# Patient Record
Sex: MALE | Race: WHITE
[De-identification: names, ages, dates, MRNs, and addresses within clinical notes are randomized per-mention and may not be internally consistent; named-entity substitution may affect disease eponyms.]

---
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
## 2023-05-30 ENCOUNTER — HOSPITAL ENCOUNTER (EMERGENCY)
Dept: HOSPITAL 4 - SED | Age: 23
Discharge: HOME | End: 2023-05-30
Payer: COMMERCIAL

## 2023-05-30 VITALS — SYSTOLIC BLOOD PRESSURE: 128 MMHG

## 2023-05-30 DIAGNOSIS — F41.9: Primary | ICD-10-CM

## 2023-05-30 DIAGNOSIS — G47.00: ICD-10-CM

## 2023-05-30 DIAGNOSIS — Z79.899: ICD-10-CM
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
## 2023-05-30 NOTE — NUR
Patient given written and verbal discharge instructions and verbalizes 
understanding.  ER MD discussed with patient the results and treatment 
provided. Patient in stable condition. ID arm band removed. 

Rx of Ativan, Melatonin, Zaleplon  given. Patient educated on pain management 
and to follow up with PMD. Pain Scale 0/10 .

Opportunity for questions provided and answered. Medication side effect fact 
sheet provided.

## 2023-06-01 ENCOUNTER — HOSPITAL ENCOUNTER (EMERGENCY)
Dept: HOSPITAL 4 - SED | Age: 23
Discharge: HOME | End: 2023-06-01
Payer: MEDICAID

## 2023-06-01 VITALS — SYSTOLIC BLOOD PRESSURE: 127 MMHG

## 2023-06-01 VITALS — BODY MASS INDEX: 25.9 KG/M2 | HEIGHT: 67 IN | WEIGHT: 165 LBS

## 2023-06-01 VITALS — SYSTOLIC BLOOD PRESSURE: 124 MMHG

## 2023-06-01 DIAGNOSIS — L03.115: Primary | ICD-10-CM

## 2023-06-01 DIAGNOSIS — M25.561: ICD-10-CM

## 2023-06-01 DIAGNOSIS — Z79.899: ICD-10-CM

## 2023-06-01 LAB
ALBUMIN SERPL BCP-MCNC: 4.1 G/DL (ref 3.4–4.8)
ALT SERPL W P-5'-P-CCNC: 52 U/L (ref 12–78)
ANION GAP SERPL CALCULATED.3IONS-SCNC: 3 MMOL/L (ref 5–15)
AST SERPL W P-5'-P-CCNC: 24 U/L (ref 10–37)
BASOPHILS # BLD AUTO: 0 K/UL (ref 0–0.2)
BASOPHILS NFR BLD AUTO: 0.5 % (ref 0–2)
BILIRUB SERPL-MCNC: 0.3 MG/DL (ref 0–1)
BUN SERPL-MCNC: 15 MG/DL (ref 8–21)
CALCIUM SERPL-MCNC: 8.4 MG/DL (ref 8.4–11)
CHLORIDE SERPL-SCNC: 102 MMOL/L (ref 98–107)
CREAT SERPL-MCNC: 0.91 MG/DL (ref 0.55–1.3)
EOSINOPHIL # BLD AUTO: 0.4 K/UL (ref 0–0.4)
EOSINOPHIL NFR BLD AUTO: 4.8 % (ref 0–4)
ERYTHROCYTE [DISTWIDTH] IN BLOOD BY AUTOMATED COUNT: 13.6 % (ref 9–15)
GFR SERPL CREATININE-BSD FRML MDRD: 135 ML/MIN (ref 90–?)
GLUCOSE SERPL-MCNC: 114 MG/DL (ref 70–99)
HCT VFR BLD AUTO: 43.6 % (ref 36–54)
HGB BLD-MCNC: 14.8 G/DL (ref 14–18)
LYMPHOCYTES # BLD AUTO: 2.4 K/UL (ref 1–5.5)
LYMPHOCYTES NFR BLD AUTO: 26.2 % (ref 20.5–51.5)
MCH RBC QN AUTO: 30 PG (ref 27–31)
MCHC RBC AUTO-ENTMCNC: 34 % (ref 32–36)
MCV RBC AUTO: 89 FL (ref 79–98)
MONOCYTES # BLD MANUAL: 0.9 K/UL (ref 0–1)
MONOCYTES # BLD MANUAL: 9.9 % (ref 1.7–9.3)
NEUTROPHILS # BLD AUTO: 5.5 K/UL (ref 1.8–7.7)
NEUTROPHILS NFR BLD AUTO: 58.6 % (ref 40–70)
PLATELET # BLD AUTO: 272 K/UL (ref 130–430)
RBC # BLD AUTO: 4.89 MIL/UL (ref 4.2–6.2)
WBC # BLD AUTO: 9.3 K/UL (ref 4.8–10.8)

## 2023-06-01 PROCEDURE — 85025 COMPLETE CBC W/AUTO DIFF WBC: CPT

## 2023-06-01 PROCEDURE — 80053 COMPREHEN METABOLIC PANEL: CPT

## 2023-06-01 PROCEDURE — 99285 EMERGENCY DEPT VISIT HI MDM: CPT

## 2023-06-01 PROCEDURE — 83605 ASSAY OF LACTIC ACID: CPT

## 2023-06-01 PROCEDURE — 96365 THER/PROPH/DIAG IV INF INIT: CPT

## 2023-06-01 PROCEDURE — 84484 ASSAY OF TROPONIN QUANT: CPT

## 2023-06-01 PROCEDURE — 96361 HYDRATE IV INFUSION ADD-ON: CPT

## 2023-06-01 PROCEDURE — 96376 TX/PRO/DX INJ SAME DRUG ADON: CPT

## 2023-06-01 PROCEDURE — 84157 ASSAY OF PROTEIN OTHER: CPT

## 2023-06-01 PROCEDURE — 87070 CULTURE OTHR SPECIMN AEROBIC: CPT

## 2023-06-01 PROCEDURE — 87040 BLOOD CULTURE FOR BACTERIA: CPT

## 2023-06-01 PROCEDURE — 82945 GLUCOSE OTHER FLUID: CPT

## 2023-06-01 PROCEDURE — 96375 TX/PRO/DX INJ NEW DRUG ADDON: CPT

## 2023-06-01 PROCEDURE — 20610 DRAIN/INJ JOINT/BURSA W/O US: CPT

## 2023-06-01 PROCEDURE — 93005 ELECTROCARDIOGRAM TRACING: CPT

## 2023-06-01 PROCEDURE — 36415 COLL VENOUS BLD VENIPUNCTURE: CPT

## 2023-06-01 PROCEDURE — 73564 X-RAY EXAM KNEE 4 OR MORE: CPT

## 2023-06-01 NOTE — NUR
Patient given written and verbal discharge instructions and verbalizes 
understanding.  ER MD discussed with patient the results and treatment 
provided. Patient in stable condition. ID arm band removed. IV catheter removed 
intact and dressing applied, no active bleeding.

Rx of cephalexin, ibuprofen, zofran, tramadol given. Patient educated on pain 
management and to follow up with PMD. Pain Scale .

Opportunity for questions provided and answered. Medication side effect fact 
sheet provided.

## 2023-06-01 NOTE — NUR
PT BIB SELF WITH C/O OF PAIN IN THE RIGHT KNEE POST FALL DOWN THE STAIRS.PT 
RIGHT KNEE DOES HAVE ERYTHEMA AND EDEMA. PT ALSO HAS PAIN WITH MOVEMENT. PT 
DENIES ANY HEAD TRAUMA AND STATES HIS KNEE ONLY HURTS. PT STATES 9 OUT OF TEN 
PAIN. PT DENIES SOB, N/V/D AND ANY URINARY ISSUES. PT HAS A HISTORY OF ANXIETY 
AND TAKES ATIVAN 1MG PO DAILY. PT IN ROOM 5 ON THE MONITOR

## 2023-06-03 ENCOUNTER — HOSPITAL ENCOUNTER (EMERGENCY)
Dept: HOSPITAL 4 - SED | Age: 23
Discharge: HOME | End: 2023-06-03
Payer: MEDICAID

## 2023-06-03 VITALS — SYSTOLIC BLOOD PRESSURE: 125 MMHG

## 2023-06-03 VITALS — HEIGHT: 66 IN | BODY MASS INDEX: 25.71 KG/M2 | WEIGHT: 160 LBS

## 2023-06-03 DIAGNOSIS — L03.115: Primary | ICD-10-CM

## 2023-06-03 DIAGNOSIS — M25.561: ICD-10-CM

## 2023-06-03 DIAGNOSIS — Z76.5: ICD-10-CM

## 2023-06-03 DIAGNOSIS — Z79.899: ICD-10-CM

## 2023-06-03 LAB
ALBUMIN SERPL BCP-MCNC: 3.9 G/DL (ref 3.4–4.8)
ALT SERPL W P-5'-P-CCNC: 41 U/L (ref 12–78)
ANION GAP SERPL CALCULATED.3IONS-SCNC: 4 MMOL/L (ref 5–15)
APPEARANCE UR: CLEAR
AST SERPL W P-5'-P-CCNC: 18 U/L (ref 10–37)
BASOPHILS # BLD AUTO: 0 K/UL (ref 0–0.2)
BASOPHILS NFR BLD AUTO: 0.3 % (ref 0–2)
BILIRUB SERPL-MCNC: 0.6 MG/DL (ref 0–1)
BILIRUB UR QL STRIP: NEGATIVE
BUN SERPL-MCNC: 16 MG/DL (ref 8–21)
CALCIUM SERPL-MCNC: 8.7 MG/DL (ref 8.4–11)
CHLORIDE SERPL-SCNC: 101 MMOL/L (ref 98–107)
COLOR UR: YELLOW
CREAT SERPL-MCNC: 1.02 MG/DL (ref 0.55–1.3)
EOSINOPHIL # BLD AUTO: 0.5 K/UL (ref 0–0.4)
EOSINOPHIL NFR BLD AUTO: 5.5 % (ref 0–4)
ERYTHROCYTE [DISTWIDTH] IN BLOOD BY AUTOMATED COUNT: 13.6 % (ref 9–15)
GFR SERPL CREATININE-BSD FRML MDRD: 119 ML/MIN (ref 90–?)
GLUCOSE SERPL-MCNC: 96 MG/DL (ref 70–99)
GLUCOSE UR STRIP-MCNC: NEGATIVE MG/DL
HCT VFR BLD AUTO: 41.3 % (ref 36–54)
HGB BLD-MCNC: 14.1 G/DL (ref 14–18)
HGB UR QL STRIP: NEGATIVE
KETONES UR STRIP-MCNC: NEGATIVE MG/DL
LEUKOCYTE ESTERASE UR QL STRIP: NEGATIVE
LYMPHOCYTES # BLD AUTO: 2.2 K/UL (ref 1–5.5)
LYMPHOCYTES NFR BLD AUTO: 25.8 % (ref 20.5–51.5)
MCH RBC QN AUTO: 30 PG (ref 27–31)
MCHC RBC AUTO-ENTMCNC: 34 % (ref 32–36)
MCV RBC AUTO: 89 FL (ref 79–98)
MONOCYTES # BLD MANUAL: 0.7 K/UL (ref 0–1)
MONOCYTES # BLD MANUAL: 7.9 % (ref 1.7–9.3)
NEUTROPHILS # BLD AUTO: 5.2 K/UL (ref 1.8–7.7)
NEUTROPHILS NFR BLD AUTO: 60.5 % (ref 40–70)
NITRITE UR QL STRIP: NEGATIVE
PH UR STRIP: 6.5 [PH] (ref 5–8)
PLATELET # BLD AUTO: 249 K/UL (ref 130–430)
PROT UR QL STRIP: NEGATIVE
RBC # BLD AUTO: 4.65 MIL/UL (ref 4.2–6.2)
SP GR UR STRIP: 1.02 (ref 1–1.03)
UROBILINOGEN UR STRIP-MCNC: 2 MG/DL (ref 0.2–1)
WBC # BLD AUTO: 8.6 K/UL (ref 4.8–10.8)

## 2023-06-03 PROCEDURE — 36415 COLL VENOUS BLD VENIPUNCTURE: CPT

## 2023-06-03 PROCEDURE — 71045 X-RAY EXAM CHEST 1 VIEW: CPT

## 2023-06-03 PROCEDURE — 99285 EMERGENCY DEPT VISIT HI MDM: CPT

## 2023-06-03 PROCEDURE — 80053 COMPREHEN METABOLIC PANEL: CPT

## 2023-06-03 PROCEDURE — 76376 3D RENDER W/INTRP POSTPROCES: CPT

## 2023-06-03 PROCEDURE — 96365 THER/PROPH/DIAG IV INF INIT: CPT

## 2023-06-03 PROCEDURE — 81003 URINALYSIS AUTO W/O SCOPE: CPT

## 2023-06-03 PROCEDURE — 73700 CT LOWER EXTREMITY W/O DYE: CPT

## 2023-06-03 PROCEDURE — 96375 TX/PRO/DX INJ NEW DRUG ADDON: CPT

## 2023-06-03 PROCEDURE — 83605 ASSAY OF LACTIC ACID: CPT

## 2023-06-03 PROCEDURE — 96366 THER/PROPH/DIAG IV INF ADDON: CPT

## 2023-06-03 PROCEDURE — 85025 COMPLETE CBC W/AUTO DIFF WBC: CPT

## 2023-06-03 PROCEDURE — 87040 BLOOD CULTURE FOR BACTERIA: CPT
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
## 2023-06-03 NOTE — NUR
iv removed from L AC, angiocath intact. bleeding controlled and no 
complications noted. discharge instructions reviewed with pt, pt verbalized 
understanding and denied any questions. pt offered wheelchair assistance, 
denied. pt ambulated from room 7 to ed exit with steady gait.

## 2023-07-10 ENCOUNTER — HOSPITAL ENCOUNTER (EMERGENCY)
Dept: HOSPITAL 4 - SED | Age: 23
Discharge: HOME | End: 2023-07-10
Payer: MEDICAID

## 2023-07-10 VITALS
HEART RATE: 87 BPM | SYSTOLIC BLOOD PRESSURE: 119 MMHG | OXYGEN SATURATION: 97 % | RESPIRATION RATE: 18 BRPM | TEMPERATURE: 98.5 F

## 2023-07-10 VITALS — HEIGHT: 66 IN | WEIGHT: 160 LBS | BODY MASS INDEX: 25.71 KG/M2

## 2023-07-10 VITALS
RESPIRATION RATE: 18 BRPM | TEMPERATURE: 98.5 F | SYSTOLIC BLOOD PRESSURE: 119 MMHG | OXYGEN SATURATION: 97 % | HEART RATE: 87 BPM

## 2023-07-10 DIAGNOSIS — T40.411A: Primary | ICD-10-CM

## 2023-07-10 DIAGNOSIS — Z79.899: ICD-10-CM

## 2023-07-10 DIAGNOSIS — R20.0: ICD-10-CM
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
## 2023-07-10 DIAGNOSIS — R56.9: ICD-10-CM

## 2023-07-10 LAB
ALBUMIN SERPL BCP-MCNC: 4 G/DL (ref 3.4–4.8)
ALT SERPL W P-5'-P-CCNC: 34 U/L (ref 12–78)
AMPHETAMINES UR QL SCN: NEGATIVE
ANION GAP SERPL CALCULATED.3IONS-SCNC: 9 MMOL/L (ref 5–15)
AST SERPL W P-5'-P-CCNC: 16 U/L (ref 10–37)
BARBITURATES UR QL SCN: NEGATIVE
BASOPHILS # BLD AUTO: 0.1 K/UL (ref 0–0.2)
BASOPHILS NFR BLD AUTO: 0.3 % (ref 0–2)
BENZODIAZ UR QL SCN: POSITIVE
BILIRUB SERPL-MCNC: 0.5 MG/DL (ref 0–1)
BUN SERPL-MCNC: 6 MG/DL (ref 8–21)
BZE UR QL SCN: NEGATIVE
CALCIUM SERPL-MCNC: 8.8 MG/DL (ref 8.4–11)
CANNABINOIDS UR QL SCN: POSITIVE
CHLORIDE SERPL-SCNC: 107 MMOL/L (ref 98–107)
CREAT SERPL-MCNC: 1.09 MG/DL (ref 0.55–1.3)
EOSINOPHIL # BLD AUTO: 0.2 K/UL (ref 0–0.4)
EOSINOPHIL NFR BLD AUTO: 1.5 % (ref 0–4)
ERYTHROCYTE [DISTWIDTH] IN BLOOD BY AUTOMATED COUNT: 13.1 % (ref 9–15)
GFR SERPL CREATININE-BSD FRML MDRD: 110 ML/MIN (ref 90–?)
GLUCOSE SERPL-MCNC: 86 MG/DL (ref 74–106)
HCT VFR BLD AUTO: 43.2 % (ref 36–54)
HGB BLD-MCNC: 14.3 G/DL (ref 14–18)
LYMPHOCYTES # BLD AUTO: 2.6 K/UL (ref 1–5.5)
LYMPHOCYTES NFR BLD AUTO: 16.2 % (ref 20.5–51.5)
MCH RBC QN AUTO: 29 PG (ref 27–31)
MCHC RBC AUTO-ENTMCNC: 33 % (ref 32–36)
MCV RBC AUTO: 88 FL (ref 79–98)
METHADONE UR-SCNC: NEGATIVE UMOL/L
METHAMPHET UR-SCNC: NEGATIVE UMOL/L
MONOCYTES # BLD MANUAL: 1.5 K/UL (ref 0–1)
MONOCYTES # BLD MANUAL: 9.1 % (ref 1.7–9.3)
NEUTROPHILS # BLD AUTO: 11.7 K/UL (ref 1.8–7.7)
NEUTROPHILS NFR BLD AUTO: 72.9 % (ref 40–70)
OPIATES UR QL SCN: NEGATIVE
OXYCODONE SERPL-MCNC: NEGATIVE NG/ML
PCP UR QL SCN: NEGATIVE
PLATELET # BLD AUTO: 307 K/UL (ref 130–430)
RBC # BLD AUTO: 4.91 MIL/UL (ref 4.2–6.2)
TRICYCLICS UR-MCNC: NEGATIVE NG/ML
URINE PROPOXYPHENE SCREEN: NEGATIVE
WBC # BLD AUTO: 16.1 K/UL (ref 4.8–10.8)

## 2023-07-18 ENCOUNTER — HOSPITAL ENCOUNTER (EMERGENCY)
Dept: HOSPITAL 4 - SED | Age: 23
Discharge: HOME | End: 2023-07-18
Payer: MEDICAID

## 2023-07-18 VITALS
HEIGHT: 66 IN | BODY MASS INDEX: 24.91 KG/M2 | HEART RATE: 77 BPM | SYSTOLIC BLOOD PRESSURE: 130 MMHG | TEMPERATURE: 98.2 F | OXYGEN SATURATION: 96 % | WEIGHT: 155 LBS | RESPIRATION RATE: 18 BRPM

## 2023-07-18 DIAGNOSIS — Z79.899: ICD-10-CM

## 2023-07-18 DIAGNOSIS — F41.9: Primary | ICD-10-CM

## 2023-07-18 DIAGNOSIS — R20.0: ICD-10-CM

## 2023-07-18 NOTE — NUR
Patient given written and verbal discharge instructions and verbalizes 
understanding.  ER MD discussed with patient the results and treatment 
provided. Patient in stable condition. ID arm band removed. 

Rx of ATIVAN given. Patient educated on pain management and to follow up with 
PMD. Pain Scale 0/10.

Opportunity for questions provided and answered. Medication side effect fact 
sheet provided.
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
## 2023-07-29 ENCOUNTER — HOSPITAL ENCOUNTER (EMERGENCY)
Dept: HOSPITAL 4 - SED | Age: 23
Discharge: HOME | End: 2023-07-29
Payer: MEDICAID

## 2023-07-29 VITALS
TEMPERATURE: 97.8 F | OXYGEN SATURATION: 97 % | SYSTOLIC BLOOD PRESSURE: 115 MMHG | HEART RATE: 115 BPM | RESPIRATION RATE: 20 BRPM

## 2023-07-29 VITALS
OXYGEN SATURATION: 98 % | RESPIRATION RATE: 17 BRPM | HEART RATE: 74 BPM | SYSTOLIC BLOOD PRESSURE: 115 MMHG | TEMPERATURE: 97.3 F

## 2023-07-29 VITALS — BODY MASS INDEX: 25.23 KG/M2 | HEIGHT: 66 IN | WEIGHT: 157 LBS

## 2023-07-29 DIAGNOSIS — R07.9: ICD-10-CM

## 2023-07-29 DIAGNOSIS — Z79.899: ICD-10-CM

## 2023-07-29 DIAGNOSIS — F41.9: Primary | ICD-10-CM

## 2023-07-29 NOTE — NUR
PT bib self awake and alert aox4, no sob or distress.  PT C/O PANIC ATTACK 
SINCE YESTERDAY.  PT STATES PALIPATION, SOB, ANXIETY.  PT DENIES N/V.  PT HAS 
HX OF ANXIETY AND DEPRESSION.  PT O2 WAS 98% ON RA.

## 2023-07-29 NOTE — NUR
Patient given written and verbal discharge instructions and verbalizes 
understanding.  ER MD DR IVEY discussed with patient the results and treatment 
provided. Patient in stable condition. ID arm band removed. 

Rx of ATIVAN given. Patient educated on pain management and to follow up with 
PMD. Pain Scale 0/10.

Opportunity for questions provided and answered. Medication side effect fact 
sheet provided.

## 2023-08-01 ENCOUNTER — HOSPITAL ENCOUNTER (EMERGENCY)
Dept: HOSPITAL 4 - SED | Age: 23
Discharge: HOME | End: 2023-08-01
Payer: MEDICAID

## 2023-08-01 VITALS — BODY MASS INDEX: 25.07 KG/M2 | WEIGHT: 156 LBS | HEIGHT: 66 IN

## 2023-08-01 VITALS
OXYGEN SATURATION: 97 % | HEART RATE: 101 BPM | TEMPERATURE: 98.3 F | RESPIRATION RATE: 18 BRPM | SYSTOLIC BLOOD PRESSURE: 117 MMHG

## 2023-08-01 VITALS
TEMPERATURE: 98.3 F | OXYGEN SATURATION: 97 % | RESPIRATION RATE: 18 BRPM | HEART RATE: 101 BPM | SYSTOLIC BLOOD PRESSURE: 117 MMHG

## 2023-08-01 DIAGNOSIS — F13.20: ICD-10-CM

## 2023-08-01 DIAGNOSIS — F41.9: Primary | ICD-10-CM

## 2023-08-01 DIAGNOSIS — Z79.899: ICD-10-CM

## 2023-08-01 NOTE — NUR
Pt brought by self, ambulatory, A&Ox4, pt presents to ER with anxiety, 
insomnia, states he quynh taking Ativan but he discontinued medication since it 
was not helping, skin pink and warm, respirations even and unlabored.

## 2023-09-03 ENCOUNTER — HOSPITAL ENCOUNTER (EMERGENCY)
Dept: HOSPITAL 4 - SED | Age: 23
Discharge: HOME | End: 2023-09-03
Payer: MEDICAID

## 2023-09-03 VITALS
TEMPERATURE: 98.4 F | HEART RATE: 97 BPM | RESPIRATION RATE: 18 BRPM | SYSTOLIC BLOOD PRESSURE: 132 MMHG | OXYGEN SATURATION: 97 %

## 2023-09-03 VITALS — WEIGHT: 155 LBS | BODY MASS INDEX: 24.91 KG/M2 | HEIGHT: 66 IN

## 2023-09-03 VITALS
RESPIRATION RATE: 16 BRPM | HEART RATE: 117 BPM | SYSTOLIC BLOOD PRESSURE: 140 MMHG | OXYGEN SATURATION: 96 % | TEMPERATURE: 98.4 F

## 2023-09-03 DIAGNOSIS — Z79.899: ICD-10-CM

## 2023-09-03 DIAGNOSIS — G47.00: ICD-10-CM

## 2023-09-03 DIAGNOSIS — F41.9: Primary | ICD-10-CM
